# Patient Record
Sex: FEMALE | Race: WHITE | NOT HISPANIC OR LATINO | Employment: UNEMPLOYED | URBAN - METROPOLITAN AREA
[De-identification: names, ages, dates, MRNs, and addresses within clinical notes are randomized per-mention and may not be internally consistent; named-entity substitution may affect disease eponyms.]

---

## 2017-05-22 ENCOUNTER — GENERIC CONVERSION - ENCOUNTER (OUTPATIENT)
Dept: OTHER | Facility: OTHER | Age: 22
End: 2017-05-22

## 2017-07-21 ENCOUNTER — TRANSCRIBE ORDERS (OUTPATIENT)
Dept: ADMINISTRATIVE | Facility: HOSPITAL | Age: 22
End: 2017-07-21

## 2017-07-21 ENCOUNTER — APPOINTMENT (OUTPATIENT)
Dept: LAB | Facility: HOSPITAL | Age: 22
End: 2017-07-21
Attending: FAMILY MEDICINE

## 2017-07-21 DIAGNOSIS — Z02.1 PRE-EMPLOYMENT EXAMINATION: ICD-10-CM

## 2017-07-21 DIAGNOSIS — Z02.1 PRE-EMPLOYMENT EXAMINATION: Primary | ICD-10-CM

## 2017-07-21 LAB — RUBV IGG SERPL IA-ACNC: 27.7 IU/ML

## 2017-07-21 PROCEDURE — 36415 COLL VENOUS BLD VENIPUNCTURE: CPT

## 2017-07-21 PROCEDURE — 86787 VARICELLA-ZOSTER ANTIBODY: CPT

## 2017-07-21 PROCEDURE — 86706 HEP B SURFACE ANTIBODY: CPT

## 2017-07-21 PROCEDURE — 86480 TB TEST CELL IMMUN MEASURE: CPT

## 2017-07-21 PROCEDURE — 86765 RUBEOLA ANTIBODY: CPT

## 2017-07-21 PROCEDURE — 86735 MUMPS ANTIBODY: CPT

## 2017-07-21 PROCEDURE — 87340 HEPATITIS B SURFACE AG IA: CPT

## 2017-07-21 PROCEDURE — 86762 RUBELLA ANTIBODY: CPT

## 2017-07-22 LAB
HBV SURFACE AB SER-ACNC: 14.41 MIU/ML
HBV SURFACE AG SER QL: NORMAL

## 2017-07-24 LAB
ANNOTATION COMMENT IMP: NORMAL
GAMMA INTERFERON BACKGROUND BLD IA-ACNC: 0.03 IU/ML
M TB IFN-G BLD-IMP: NEGATIVE
M TB IFN-G CD4+ BCKGRND COR BLD-ACNC: <0.01 IU/ML
M TB IFN-G CD4+ T-CELLS BLD-ACNC: 0.02 IU/ML
MITOGEN IGNF BLD-ACNC: >10 IU/ML
QUANTIFERON-TB GOLD IN TUBE: NORMAL
SERVICE CMNT-IMP: NORMAL

## 2017-07-25 LAB
MEV IGM SER-ACNC: <0.8 AU (ref 0–0.79)
MUV IGG SER QL: ABNORMAL
VZV IGG SER IA-ACNC: NORMAL

## 2017-10-19 ENCOUNTER — ALLSCRIPTS OFFICE VISIT (OUTPATIENT)
Dept: OTHER | Facility: OTHER | Age: 22
End: 2017-10-19

## 2017-10-20 NOTE — PROGRESS NOTES
Assessment  1  Other acute sinusitis (461 8) (J01 80)    Plan  Other acute sinusitis    · Azithromycin 250 MG Oral Tablet; TAKE 2 TABLETS ON DAY 1 THEN TAKE 1  TABLET A DAY FOR 4 DAYS    Chief Complaint  headaches,ears hurt, chest discomfort      History of Present Illness  HPI: pt states she has had been getting HA and her ears have been bothering her pts tates she has been coughing a little but not muchand chills yesterday       Review of Systems    Constitutional: feeling poorly, but-- as noted in HPI    ENT: hoarseness  Cardiovascular: no complaints of slow or fast heart rate, no chest pain, no palpitations, no leg claudication or lower extremity edema  Respiratory: cough, but-- as noted in HPI  Gastrointestinal: no complaints of abdominal pain, no constipation, no nausea or diarrhea, no vomiting, no bloody stools  Genitourinary: no complaints of dysuria, no incontinence, no pelvic pain, no dysmenorrhea, no vaginal discharge or abnormal vaginal bleeding  Active Problems  1  Acute UTI (599 0) (N39 0)   2  Body mass index (BMI) of 22 0 to 22 9 in adult (V85 1) (Z68 22)   3  Chronic allergic conjunctivitis (372 14) (H10 45)   4  Eczema, unspecified eczema   5  Encounter for PPD test (V74 1) (Z11 1)   6  History of Encounter for routine child health examination w/o abnormal findings (V20 2)   (Z00 129)   7  Encounter for screening for malignant neoplasm of colon (V76 51) (Z12 11)   8  Need for hepatitis B vaccination (V05 3) (Z23)   9  Need for influenza vaccination (V04 81) (Z23)   10  Oral contraceptive use (V25 41) (Z30 41)    Past Medical History  1  History of Abdominal pain of multiple sites (789 09) (R10 9)   2  History of Acute pharyngitis (462) (J02 9)   3  Acute pharyngitis (462) (J02 9)   4  History of Acute upper respiratory infection (465 9) (J06 9)   5  History of Chalazion (373 2) (H00 19)   6   History of Encounter for routine child health examination w/o abnormal findings (V20 2) (Z00 129)   7  History of urinary tract infection (V13 02) (Z87 440)   8  History of Infective otitis externa, unspecified laterality (380 10) (H60 399)   9  Other acute sinusitis (461 8) (J01 80)   10  History of Otitis media, unspecified laterality   11  Sore throat (462) (J02 9)   12  History of Vulvovaginitis candida albicans (112 1) (B37 3)  Active Problems And Past Medical History Reviewed: The active problems and past medical history were reviewed and updated today  Family History  Mother    1  No pertinent family history  Father    2  Family history of hypertension (V17 49) (Z82 49)  Family History Reviewed: The family history was reviewed and updated today  Social History   · Never A Smoker   · Oral contraceptive use (V25 41) (Z30 41)  The social history was reviewed and updated today  Surgical History  Surgical History Reviewed: The surgical history was reviewed and updated today  Current Meds   1  Viorele 0 15-0 02/0 01 MG (21/5) Oral Tablet; One daily; Therapy: (07794 12 60 01) to Recorded    The medication list was reviewed and updated today  Allergies  1  No Known Drug Allergies  2  Wheat    Vitals   Recorded: [id number] 03:11PM   Temperature 98 2 F   Heart Rate 80   Respiration 16   Systolic 96   Diastolic 54   Height 5 ft 1 in   Weight 110 lb    BMI Calculated 20 78   BSA Calculated 1 47     Physical Exam    Constitutional   General appearance: No acute distress, well appearing and well nourished  Eyes   Conjunctiva and lids: No swelling, erythema or discharge  Pupils and irises: Equal, round and reactive to light  Ears, Nose, Mouth, and Throat   External inspection of ears and nose: Normal     Otoscopic examination: Abnormal   The right tympanic membrane was bulging  The left tympanic membrane was bulging  Nasal mucosa, septum, and turbinates: Abnormal   The bilateral nasal mucosa was red     Oropharynx: Abnormal   The posterior pharynx was erythematous  Pulmonary   Auscultation of lungs: Clear to auscultation  Cardiovascular   Auscultation of heart: Normal rate and rhythm, normal S1 and S2, without murmurs  Abdomen   Abdomen: Non-tender, no masses           Signatures   Electronically signed by : Amadou Amado DO; Oct 19 2017  3:43PM EST                       (Author)

## 2018-01-13 VITALS
HEART RATE: 80 BPM | SYSTOLIC BLOOD PRESSURE: 96 MMHG | HEIGHT: 61 IN | BODY MASS INDEX: 20.77 KG/M2 | DIASTOLIC BLOOD PRESSURE: 54 MMHG | TEMPERATURE: 98.2 F | WEIGHT: 110 LBS | RESPIRATION RATE: 16 BRPM

## 2018-01-16 NOTE — PROGRESS NOTES
Assessment    1  Other acute sinusitis (461 8) (J01 80)    Plan  Other acute sinusitis    · Amoxicillin-Pot Clavulanate 875-125 MG Oral Tablet (Augmentin); TAKE 1 TABLET  EVERY 12 HOURS DAILY    Chief Complaint  complaining of cough and cold symptoms with green/yellow phlegm  acgCMA      History of Present Illness  HPI: pt states she keeps getting sick, states she has bad head pain  throat hurts, head hurts congeted cant breath  drip  green and yellow mucus  Active Problems    1  Chronic allergic conjunctivitis (372 14) (H10 45)   2  Eczema, unspecified eczema (692 9) (L30 9)   3  Encounter for PPD test (V74 1) (Z11 1)   4  History of Encounter for routine child health examination w/o abnormal findings (V20 2)   (Z00 129)   5  Encounter for screening for malignant neoplasm of colon (V76 51) (Z12 11)   6  Need for hepatitis B vaccination (V05 3) (Z23)   7  Need for influenza vaccination (V04 81) (Z23)   8  Oral contraceptive use (V25 41) (Z30 41)    Past Medical History    1  History of Abdominal pain of multiple sites (789 09) (R10 9)   2  Acute pharyngitis (462) (J02 9)   3  History of Acute pharyngitis (462) (J02 9)   4  History of Chalazion (373 2) (H00 19)   5  History of Encounter for routine child health examination w/o abnormal findings (V20 2)   (Z00 129)   6  History of urinary tract infection (V13 02) (Z87 440)   7  History of Infective otitis externa, unspecified laterality (380 10) (H60 399)   8  History of Otitis media, unspecified laterality   9  Sore throat (462) (J02 9)   10  History of Vulvovaginitis candida albicans (112 1) (B37 3)  Active Problems And Past Medical History Reviewed: The active problems and past medical history were reviewed and updated today  Family History    1  No pertinent family history    2  Family history of hypertension (V17 49) (Z82 49)  Family History Reviewed: The family history was reviewed and updated today         Social History    · Never A Smoker   · Oral contraceptive use (V25 41) (Z30 41)  The social history was reviewed and updated today  Surgical History  Surgical History Reviewed: The surgical history was reviewed and updated today  Current Meds   1  Viorele 0 15-0 02/0 01 MG (21/5) Oral Tablet; One daily; Therapy: [phone]) to Recorded    The medication list was reviewed and updated today  Allergies    1  No Known Drug Allergies    2  Wheat    Vitals   Recorded: [id number] 11:46AM   Temperature 98 3 F   Heart Rate 70   Respiration 16   Systolic 359   Diastolic 60   Height 5 ft 1 in   Weight 115 lb    BMI Calculated 21 73   BSA Calculated 1 49     Physical Exam    Constitutional   General appearance: No acute distress, well appearing and well nourished  Eyes   Conjunctiva and lids: No swelling, erythema or discharge  Pupils and irises: Equal, round and reactive to light  Ears, Nose, Mouth, and Throat   External inspection of ears and nose: Normal     Otoscopic examination: Abnormal   The right tympanic membrane was bulging  The left tympanic membrane was bulging  Nasal mucosa, septum, and turbinates: Abnormal   The bilateral nasal mucosa was red  Oropharynx: Abnormal   The posterior pharynx was erythematous, but did not have an exudate  Pulmonary   Auscultation of lungs: Clear to auscultation  Cardiovascular   Auscultation of heart: Normal rate and rhythm, normal S1 and S2, without murmurs  Abdomen   Abdomen: Non-tender, no masses           Signatures   Electronically signed by : Irene Tompkins DO; Feb 1 2016 11:57AM EST                       (Author)

## 2018-04-16 ENCOUNTER — OFFICE VISIT (OUTPATIENT)
Dept: FAMILY MEDICINE CLINIC | Facility: CLINIC | Age: 23
End: 2018-04-16
Payer: COMMERCIAL

## 2018-04-16 VITALS
DIASTOLIC BLOOD PRESSURE: 54 MMHG | SYSTOLIC BLOOD PRESSURE: 100 MMHG | RESPIRATION RATE: 16 BRPM | HEART RATE: 72 BPM | TEMPERATURE: 97.5 F | WEIGHT: 122 LBS | HEIGHT: 61 IN | BODY MASS INDEX: 23.03 KG/M2

## 2018-04-16 DIAGNOSIS — J02.9 EXUDATIVE PHARYNGITIS: Primary | ICD-10-CM

## 2018-04-16 PROCEDURE — 3008F BODY MASS INDEX DOCD: CPT | Performed by: NURSE PRACTITIONER

## 2018-04-16 PROCEDURE — 99213 OFFICE O/P EST LOW 20 MIN: CPT | Performed by: NURSE PRACTITIONER

## 2018-04-16 RX ORDER — DESOGESTREL AND ETHINYL ESTRADIOL 21-5 (28)
1 KIT ORAL DAILY
COMMUNITY

## 2018-04-16 RX ORDER — AMOXICILLIN 875 MG/1
875 TABLET, COATED ORAL 2 TIMES DAILY
Qty: 20 TABLET | Refills: 0 | Status: SHIPPED | OUTPATIENT
Start: 2018-04-16 | End: 2018-04-26

## 2018-04-16 NOTE — PATIENT INSTRUCTIONS
Take medication with food  It is important that you take the entire course of antibiotics prescribed  May also take a probiotic of your choice to maintain healthy GI jazmine  Can take some probiotic and yogurt with the medication  Increase fluid intake, saline nasal rinses, and hot tea with honey and lemon  Cool air humidification can be helpful as well  May take Ibuprofen or Tylenol as needed for pain or fevers  Mucinex D for sinus congestion or Coricidin HBP if you have high blood pressure or a heart condition  Mucinex or Robitussin DM are effective for cough and chest congestion  Supportive care discussed and advised  Follow up for no improvement and worsening of conditions  Patient advised and educated when to see immediate medical care  Pharyngitis   AMBULATORY CARE:   Pharyngitis , or sore throat, is inflammation of the tissues and structures in your pharynx (throat)  Pharyngitis is most often caused by bacteria  It may also be caused by a cold or flu virus  Other causes include smoking, allergies, or acid reflux  Signs and symptoms that may occur with pharyngitis:   · Sore throat or pain when you swallow    · Fever, chills, and body aches    · Hoarse or raspy voice    · Cough, runny or stuffy nose, itchy or watery eyes    · Headache    · Upset stomach and loss of appetite    · Mild neck stiffness    · Swollen glands that feel like hard lumps when you touch your neck    · White and yellow pus-filled blisters in the back of your throat  Call 911 for any of the following:   · You have trouble breathing or swallowing because your throat is swollen or sore  Seek care immediately if:   · You are drooling because it hurts too much to swallow  · Your fever is higher than 102? F (39?C) or lasts longer than 3 days  · You are confused  · You taste blood in your throat  Contact your healthcare provider if:   · Your throat pain gets worse      · You have a painful lump in your throat that does not go away after 5 days  · Your symptoms do not improve after 5 days  · You have questions or concerns about your condition or care  Treatment for pharyngitis:  Viral pharyngitis will go away on its own without treatment  Your sore throat should start to feel better in 3 to 5 days for both viral and bacterial infections  You may need any of the following:  · Antibiotics  treat a bacterial infection  · NSAIDs , such as ibuprofen, help decrease swelling, pain, and fever  NSAIDs can cause stomach bleeding or kidney problems in certain people  If you take blood thinner medicine, always ask your healthcare provider if NSAIDs are safe for you  Always read the medicine label and follow directions  · Acetaminophen  decreases pain and fever  It is available without a doctor's order  Ask how much to take and how often to take it  Follow directions  Acetaminophen can cause liver damage if not taken correctly  Manage your symptoms:   · Gargle salt water  Mix ¼ teaspoon salt in an 8 ounce glass of warm water and gargle  This may help decrease swelling in your throat  · Drink liquids as directed  You may need to drink more liquids than usual  Liquids may help soothe your throat and prevent dehydration  Ask how much liquid to drink each day and which liquids are best for you  · Use a cool-steam humidifier  to help moisten the air in your room and calm your cough  · Soothe your throat  with cough drops, ice, soft foods, or popsicles  Prevent the spread of pharyngitis:  Cover your mouth and nose when you cough or sneeze  Do not share food or drinks  Wash your hands often  Use soap and water  If soap and water are unavailable, use an alcohol based hand   Follow up with your healthcare provider as directed:  Write down your questions so you remember to ask them during your visits     © 2017 [employer] Information is for End User's use only and may not be sold, redistributed or otherwise used for commercial purposes  All illustrations and images included in CareNotes® are the copyrighted property of [employer]  or [address]  The above information is an  only  It is not intended as medical advice for individual conditions or treatments  Talk to your doctor, nurse or pharmacist before following any medical regimen to see if it is safe and effective for you  Amoxicillin (By mouth)   Amoxicillin ([id number])  Treats infections or stomach ulcers  This medicine is a penicillin antibiotic  Brand Name(s): Amoxil, Moxatag, Omeclamox-Benito, Prevpac, Triple Therapy   There may be other brand names for this medicine  When This Medicine Should Not Be Used: This medicine is not right for everyone  You should not use it if you had an allergic reaction to amoxicillin, any type of penicillin, or a cephalosporin antibiotic  How to Use This Medicine:   Capsule, Liquid, Tablet, Chewable Tablet, Long Acting Tablet  · Your doctor will tell you how much medicine to use  Do not use more than directed  · Chewable tablet: You must chew the tablet before you swallow it  You may crush the tablet and mix the medicine with a small amount of food to make it easier to swallow  · Oral liquid: Shake well just before each use  · Measure the oral liquid medicine with a marked measuring spoon, oral syringe, or medicine cup  You may mix the oral liquid with a baby formula, milk, fruit juice, water, ginger ale, or another cold drink  Be sure your child drinks all of the mixture right away  · Tablet for suspension: Place the tablet in a small drinking glass, and add 2 teaspoons of water  Do not use any other liquid  Gently stir or swirl the water in the glass until the tablet is completely dissolved  Drink all of this mixture right away  Add more water to the glass and drink all of it to make sure you get all of the medicine   Do not chew or swallow the tablet for suspension  · Take all of the medicine in your prescription to clear up your infection, even if you feel better after the first few doses  · Take a dose as soon as you remember  If it is almost time for your next dose, wait until then and take a regular dose  Do not take extra medicine to make up for a missed dose  · Store the tablets, capsules, and tablets for suspension at room temperature, away from heat, moisture, and direct light  · Store the oral liquid in the refrigerator  Do not freeze  Throw away any unused medicine after 14 days  Drugs and Foods to Avoid:   Ask your doctor or pharmacist before using any other medicine, including over-the-counter medicines, vitamins, and herbal products  · Some medicines can affect how amoxicillin works  Tell your doctor if you are also using any of the following:   ¨ Allopurinol  ¨ Probenecid  ¨ Birth control pills  ¨ A blood thinner  Warnings While Using This Medicine:   · Tell your doctor if you are pregnant or breastfeeding, or if you have kidney disease, allergies, or a condition called phenylketonuria (PKU)  Tell your doctor if you are on dialysis  · This medicine can cause diarrhea  Call your doctor if the diarrhea becomes severe, does not stop, or is bloody  Do not take any medicine to stop diarrhea until you have talked to your doctor  Diarrhea can occur 2 months or more after you stop taking this medicine  · Tell any doctor or dentist who treats you that you are using this medicine  This medicine may affect certain medical test results  · Call your doctor if your symptoms do not improve or if they get worse  · Use this medicine to treat only the infection your doctor has prescribed it for  Do not use this medicine for any infection or condition that has not been checked by a doctor  This medicine will not treat the flu or the common cold  · Keep all medicine out of the reach of children  Never share your medicine with anyone    Possible Side Effects While Using This Medicine:   Call your doctor right away if you notice any of these side effects:  · Allergic reaction: Itching or hives, swelling in your face or hands, swelling or tingling in your mouth or throat, chest tightness, trouble breathing  · Blistering, peeling, or red skin rash  · Diarrhea that may contain blood, stomach cramps, fever  If you notice these less serious side effects, talk with your doctor:   · Mild diarrhea, nausea, or vomiting  · Mild skin rash  If you notice other side effects that you think are caused by this medicine, tell your doctor  Call your doctor for medical advice about side effects  You may report side effects to FDA at 4-635-FDA-9318  © 2017 [employer] Information is for End User's use only and may not be sold, redistributed or otherwise used for commercial purposes  The above information is an  only  It is not intended as medical advice for individual conditions or treatments  Talk to your doctor, nurse or pharmacist before following any medical regimen to see if it is safe and effective for you

## 2018-04-16 NOTE — PROGRESS NOTES
Assessment/Plan:  Take medication with food  It is important that you take the entire course of antibiotics prescribed  May also take a probiotic of your choice to maintain healthy GI jazmine  Can take some probiotic and yogurt with the medication  Increase fluid intake, saline nasal rinses, and hot tea with honey and lemon  Cool air humidification can be helpful as well  May take Ibuprofen or Tylenol as needed for pain or fevers  Mucinex D for sinus congestion or Coricidin HBP if you have high blood pressure or a heart condition  Mucinex or Robitussin DM are effective for cough and chest congestion  Supportive care discussed and advised  Follow up for no improvement and worsening of conditions  Patient advised and educated when to see immediate medical care  Diagnoses and all orders for this visit:    Exudative pharyngitis  -     amoxicillin (AMOXIL) 875 mg tablet; Take 1 tablet (875 mg total) by mouth 2 (two) times a day for 10 days    BMI 23 0-23 9, adult    Other orders  -     desogestrel-ethinyl estradiol (VIORELE) 0 15-0 02/0 01 MG (21/5) per tablet; Take 1 tablet by mouth daily          Return if symptoms worsen or fail to improve  Subjective:      Patient ID: Alex Rascon is a 23 y o  female  Chief Complaint   Patient presents with    Cough     Started one week ago  No known fevers JMoyleLPN    Headache    Earache       HPI   Patient having nasal congestion, sore throat, headache and ear ache from 5 days and using Mucinex every 4 hours but not helping and getting worse  Denies chest pain, SOB, fever and chills  The following portions of the patient's history were reviewed and updated as appropriate: allergies, current medications, past family history, past medical history, past social history, past surgical history and problem list       Review of Systems   Constitutional: Negative for chills, fatigue and fever     HENT: Positive for congestion, ear pain and sore throat  Negative for ear discharge, facial swelling, hearing loss, mouth sores, nosebleeds, postnasal drip, rhinorrhea, sinus pain, sinus pressure, sneezing, trouble swallowing and voice change  Respiratory: Negative for cough, chest tightness, shortness of breath and wheezing  Cardiovascular: Negative  Gastrointestinal: Negative for abdominal pain, constipation, diarrhea and nausea  Neurological: Positive for headaches  Negative for dizziness  Objective:    History   Smoking Status    Not on file   Smokeless Tobacco    Not on file       Allergies: Allergies   Allergen Reactions    Wheat Bran Abdominal Pain       Vitals:  /54   Pulse 72   Temp 97 5 °F (36 4 °C)   Resp 16   Ht 5' 1" (1 549 m)   Wt 55 3 kg (122 lb)   LMP 02/20/2018 Comment: approx   BMI 23 05 kg/m²     Current Outpatient Prescriptions   Medication Sig Dispense Refill    desogestrel-ethinyl estradiol (VIORELE) 0 15-0 02/0 01 MG (21/5) per tablet Take 1 tablet by mouth daily      amoxicillin (AMOXIL) 875 mg tablet Take 1 tablet (875 mg total) by mouth 2 (two) times a day for 10 days 20 tablet 0     No current facility-administered medications for this visit  Physical Exam   Constitutional: She is oriented to person, place, and time  She appears well-developed and well-nourished  HENT:   Head: Normocephalic  Right Ear: Tympanic membrane, external ear and ear canal normal    Left Ear: Tympanic membrane, external ear and ear canal normal    Nose: Nose normal  Right sinus exhibits no maxillary sinus tenderness and no frontal sinus tenderness  Left sinus exhibits no maxillary sinus tenderness and no frontal sinus tenderness  Mouth/Throat: Mucous membranes are normal  Oropharyngeal exudate and posterior oropharyngeal erythema present  Neck: Neck supple  Cardiovascular: Normal rate, regular rhythm and normal heart sounds      Pulmonary/Chest: Effort normal and breath sounds normal    Musculoskeletal: Normal range of motion  Lymphadenopathy:        Right cervical: No superficial cervical and no posterior cervical adenopathy present  Left cervical: No superficial cervical and no posterior cervical adenopathy present  Neurological: She is alert and oriented to person, place, and time  Skin: Skin is warm and dry  Psychiatric: She has a normal mood and affect  Her behavior is normal  Judgment and thought content normal    Vitals reviewed          LUIS Bynum

## 2018-05-01 ENCOUNTER — OFFICE VISIT (OUTPATIENT)
Dept: FAMILY MEDICINE CLINIC | Facility: CLINIC | Age: 23
End: 2018-05-01
Payer: COMMERCIAL

## 2018-05-01 VITALS
HEART RATE: 74 BPM | WEIGHT: 121.6 LBS | BODY MASS INDEX: 22.98 KG/M2 | SYSTOLIC BLOOD PRESSURE: 96 MMHG | DIASTOLIC BLOOD PRESSURE: 54 MMHG | TEMPERATURE: 98.6 F | RESPIRATION RATE: 16 BRPM

## 2018-05-01 DIAGNOSIS — H69.82 DYSFUNCTION OF LEFT EUSTACHIAN TUBE: Primary | ICD-10-CM

## 2018-05-01 PROCEDURE — 1036F TOBACCO NON-USER: CPT | Performed by: NURSE PRACTITIONER

## 2018-05-01 PROCEDURE — 99213 OFFICE O/P EST LOW 20 MIN: CPT | Performed by: NURSE PRACTITIONER

## 2018-05-01 RX ORDER — METHYLPREDNISOLONE 4 MG/1
TABLET ORAL
Qty: 1 EACH | Refills: 0 | Status: SHIPPED | OUTPATIENT
Start: 2018-05-01 | End: 2018-05-07

## 2018-05-01 NOTE — PROGRESS NOTES
Assessment/Plan:    Problem List Items Addressed This Visit     None      Visit Diagnoses     Dysfunction of left eustachian tube    -  Primary    Relevant Medications    Methylprednisolone 4 MG TBPK          Patient Instructions   Salt water gargles, hot tea with honey and lemon  Tylenol or Advil as needed       No Follow-up on file  Subjective:      Patient ID: Cynthia Bellamy is a 23 y o  female  Chief Complaint   Patient presents with    throat pain     sharp pain on the rigth side in her throat after completing  abx which was on friday 4/27/18  af/rma        She completed a course of Amoxicillin on 4/24 for exudative pharyngitis  Once she stopped, she developed sharp pains in the left side of her throat that can be severe  This is worse with sneezing, coughing, or swallowing  Had residual post nasal drip and nasal sinus congestion  Denies fevers  Not taking anything OTC for this         The following portions of the patient's history were reviewed and updated as appropriate: allergies, current medications, past family history, past medical history, past social history, past surgical history and problem list     Review of Systems   Constitutional: Negative for chills, fatigue and fever  HENT: Positive for congestion, postnasal drip and sore throat  Negative for ear pain, rhinorrhea and sinus pressure  Respiratory: Negative for cough, shortness of breath and wheezing  Cardiovascular: Negative for chest pain  Gastrointestinal: Negative for abdominal pain, diarrhea, nausea and vomiting  Musculoskeletal: Negative for arthralgias  Skin: Negative for rash  Neurological: Negative for headaches           Current Outpatient Prescriptions   Medication Sig Dispense Refill    desogestrel-ethinyl estradiol (VIORELE) 0 15-0 02/0 01 MG (21/5) per tablet Take 1 tablet by mouth daily      Methylprednisolone 4 MG TBPK Use as directed on package 1 each 0     No current facility-administered medications for this visit  Objective:    BP 96/54   Pulse 74   Temp 98 6 °F (37 °C)   Resp 16   Wt 55 2 kg (121 lb 9 6 oz)   LMP 02/26/2018   BMI 22 98 kg/m²        Physical Exam   Constitutional: She appears well-developed and well-nourished  HENT:   Right Ear: External ear and ear canal normal  Tympanic membrane is bulging  Left Ear: External ear and ear canal normal  Tympanic membrane is bulging  Nose: No mucosal edema  Mouth/Throat: Oropharynx is clear and moist and mucous membranes are normal    Eyes: Conjunctivae are normal    Cardiovascular: Normal rate, regular rhythm and normal heart sounds  Pulmonary/Chest: Effort normal and breath sounds normal    Abdominal: Bowel sounds are normal  She exhibits no distension  There is no splenomegaly or hepatomegaly  There is no tenderness  Lymphadenopathy:        Right cervical: No superficial cervical adenopathy present  Left cervical: No superficial cervical adenopathy present  Skin: No rash noted  Psychiatric: She has a normal mood and affect  Nursing note and vitals reviewed               Juana Hansen

## 2019-03-20 ENCOUNTER — HOSPITAL ENCOUNTER (EMERGENCY)
Facility: HOSPITAL | Age: 24
Discharge: HOME | End: 2019-03-21
Attending: EMERGENCY MEDICINE | Admitting: EMERGENCY MEDICINE
Payer: COMMERCIAL

## 2019-03-20 VITALS
BODY MASS INDEX: 22.66 KG/M2 | DIASTOLIC BLOOD PRESSURE: 92 MMHG | OXYGEN SATURATION: 99 % | TEMPERATURE: 98.2 F | HEIGHT: 61 IN | WEIGHT: 120 LBS | HEART RATE: 109 BPM | SYSTOLIC BLOOD PRESSURE: 151 MMHG | RESPIRATION RATE: 20 BRPM

## 2019-03-20 DIAGNOSIS — K52.9 GASTROENTERITIS: ICD-10-CM

## 2019-03-20 DIAGNOSIS — R11.2 NAUSEA VOMITING AND DIARRHEA: Primary | ICD-10-CM

## 2019-03-20 DIAGNOSIS — R19.7 NAUSEA VOMITING AND DIARRHEA: Primary | ICD-10-CM

## 2019-03-20 LAB
ALBUMIN SERPL BCP-MCNC: 3.8 G/DL (ref 3.5–5)
ALP SERPL-CCNC: 51 U/L (ref 46–116)
ALT SERPL W P-5'-P-CCNC: 22 U/L (ref 12–78)
ANION GAP SERPL CALCULATED.3IONS-SCNC: 14 MMOL/L (ref 4–13)
AST SERPL W P-5'-P-CCNC: 16 U/L (ref 5–45)
BACTERIA UR QL AUTO: ABNORMAL /HPF
BASOPHILS # BLD AUTO: 0.02 THOUSANDS/ΜL (ref 0–0.1)
BASOPHILS NFR BLD AUTO: 0 % (ref 0–1)
BILIRUB SERPL-MCNC: 0.4 MG/DL (ref 0.2–1)
BILIRUB UR QL STRIP: ABNORMAL
BUN SERPL-MCNC: 10 MG/DL (ref 5–25)
CALCIUM SERPL-MCNC: 9.3 MG/DL (ref 8.3–10.1)
CHLORIDE SERPL-SCNC: 102 MMOL/L (ref 100–108)
CLARITY UR: CLEAR
CO2 SERPL-SCNC: 23 MMOL/L (ref 21–32)
COLOR UR: YELLOW
CREAT SERPL-MCNC: 0.86 MG/DL (ref 0.6–1.3)
EOSINOPHIL # BLD AUTO: 0.03 THOUSAND/ΜL (ref 0–0.61)
EOSINOPHIL NFR BLD AUTO: 0 % (ref 0–6)
ERYTHROCYTE [DISTWIDTH] IN BLOOD BY AUTOMATED COUNT: 13.5 % (ref 11.6–15.1)
EXT PREG TEST URINE: NEGATIVE
GFR SERPL CREATININE-BSD FRML MDRD: 94 ML/MIN/1.73SQ M
GLUCOSE SERPL-MCNC: 130 MG/DL (ref 65–140)
GLUCOSE UR STRIP-MCNC: NEGATIVE MG/DL
HCT VFR BLD AUTO: 45.3 % (ref 34.8–46.1)
HGB BLD-MCNC: 14.6 G/DL (ref 11.5–15.4)
HGB UR QL STRIP.AUTO: NEGATIVE
IMM GRANULOCYTES # BLD AUTO: 0.21 THOUSAND/UL (ref 0–0.2)
IMM GRANULOCYTES NFR BLD AUTO: 1 % (ref 0–2)
KETONES UR STRIP-MCNC: ABNORMAL MG/DL
LEUKOCYTE ESTERASE UR QL STRIP: ABNORMAL
LIPASE SERPL-CCNC: 92 U/L (ref 73–393)
LYMPHOCYTES # BLD AUTO: 1.89 THOUSANDS/ΜL (ref 0.6–4.47)
LYMPHOCYTES NFR BLD AUTO: 13 % (ref 14–44)
MCH RBC QN AUTO: 27.1 PG (ref 26.8–34.3)
MCHC RBC AUTO-ENTMCNC: 32.2 G/DL (ref 31.4–37.4)
MCV RBC AUTO: 84 FL (ref 82–98)
MONOCYTES # BLD AUTO: 0.81 THOUSAND/ΜL (ref 0.17–1.22)
MONOCYTES NFR BLD AUTO: 5 % (ref 4–12)
MUCOUS THREADS UR QL AUTO: ABNORMAL
NEUTROPHILS # BLD AUTO: 12.1 THOUSANDS/ΜL (ref 1.85–7.62)
NEUTS SEG NFR BLD AUTO: 81 % (ref 43–75)
NITRITE UR QL STRIP: NEGATIVE
NON-SQ EPI CELLS URNS QL MICRO: ABNORMAL /HPF
NRBC BLD AUTO-RTO: 0 /100 WBCS
PH UR STRIP.AUTO: 5.5 [PH]
PLATELET # BLD AUTO: 310 THOUSANDS/UL (ref 149–390)
PMV BLD AUTO: 10.1 FL (ref 8.9–12.7)
POTASSIUM SERPL-SCNC: 3.5 MMOL/L (ref 3.5–5.3)
PROT SERPL-MCNC: 7.9 G/DL (ref 6.4–8.2)
PROT UR STRIP-MCNC: ABNORMAL MG/DL
RBC # BLD AUTO: 5.39 MILLION/UL (ref 3.81–5.12)
RBC #/AREA URNS AUTO: ABNORMAL /HPF
SODIUM SERPL-SCNC: 139 MMOL/L (ref 136–145)
SP GR UR STRIP.AUTO: >=1.03 (ref 1–1.03)
UROBILINOGEN UR QL STRIP.AUTO: 0.2 E.U./DL
WBC # BLD AUTO: 15.06 THOUSAND/UL (ref 4.31–10.16)
WBC #/AREA URNS AUTO: ABNORMAL /HPF

## 2019-03-20 PROCEDURE — 96374 THER/PROPH/DIAG INJ IV PUSH: CPT

## 2019-03-20 PROCEDURE — 80053 COMPREHEN METABOLIC PANEL: CPT

## 2019-03-20 PROCEDURE — 81001 URINALYSIS AUTO W/SCOPE: CPT

## 2019-03-20 PROCEDURE — 96375 TX/PRO/DX INJ NEW DRUG ADDON: CPT

## 2019-03-20 PROCEDURE — 96361 HYDRATE IV INFUSION ADD-ON: CPT

## 2019-03-20 PROCEDURE — 85025 COMPLETE CBC W/AUTO DIFF WBC: CPT

## 2019-03-20 PROCEDURE — 83690 ASSAY OF LIPASE: CPT

## 2019-03-20 PROCEDURE — 81025 URINE PREGNANCY TEST: CPT

## 2019-03-20 PROCEDURE — 99283 EMERGENCY DEPT VISIT LOW MDM: CPT

## 2019-03-20 PROCEDURE — 36415 COLL VENOUS BLD VENIPUNCTURE: CPT

## 2019-03-20 RX ORDER — ONDANSETRON 2 MG/ML
4 INJECTION INTRAMUSCULAR; INTRAVENOUS ONCE
Status: COMPLETED | OUTPATIENT
Start: 2019-03-20 | End: 2019-03-20

## 2019-03-20 RX ADMIN — SODIUM CHLORIDE 1000 ML: 0.9 INJECTION, SOLUTION INTRAVENOUS at 23:50

## 2019-03-20 RX ADMIN — MORPHINE SULFATE 2 MG: 2 INJECTION, SOLUTION INTRAMUSCULAR; INTRAVENOUS at 22:40

## 2019-03-20 RX ADMIN — ONDANSETRON 4 MG: 2 INJECTION INTRAMUSCULAR; INTRAVENOUS at 22:39

## 2019-03-20 RX ADMIN — SODIUM CHLORIDE 1000 ML: 0.9 INJECTION, SOLUTION INTRAVENOUS at 22:29

## 2019-03-21 ENCOUNTER — VBI (OUTPATIENT)
Dept: FAMILY MEDICINE CLINIC | Facility: CLINIC | Age: 24
End: 2019-03-21

## 2019-03-21 RX ORDER — ONDANSETRON 4 MG/1
4 TABLET, FILM COATED ORAL EVERY 6 HOURS PRN
Qty: 8 TABLET | Refills: 0 | Status: SHIPPED | OUTPATIENT
Start: 2019-03-21

## 2019-03-21 NOTE — ED PROVIDER NOTES
History  Chief Complaint   Patient presents with    Vomiting     patient started vomiting and diarrhea about 2030, does have celiac disease and was supposed to have gluten free pasta from a restaurant, has had many vomiting epuisodes in past 1 5 hours     21 yo female ate pasta ordered from take out about 4 hours ago and then 2 hours ago started with severe abdominal pains and recurrent vomiting   + associated diarrhea  No fever  Had been visiting brother in hospital as well all day today  Had celiac and is very sensitive per   History provided by:  Patient and significant other   used: No    Vomiting   Associated symptoms: abdominal pain and diarrhea    Associated symptoms: no cough, no fever, no headaches and no myalgias        Prior to Admission Medications   Prescriptions Last Dose Informant Patient Reported? Taking?   desogestrel-ethinyl estradiol (VIORELE) 0 15-0 02/0 01 MG (21/5) per tablet   Yes No   Sig: Take 1 tablet by mouth daily      Facility-Administered Medications: None       Past Medical History:   Diagnosis Date    Celiac disease     IBS (irritable bowel syndrome)        Past Surgical History:   Procedure Laterality Date    ESOPHAGOGASTRODUODENOSCOPY         History reviewed  No pertinent family history  I have reviewed and agree with the history as documented  Social History     Tobacco Use    Smoking status: Never Smoker    Smokeless tobacco: Never Used   Substance Use Topics    Alcohol use: Yes     Comment: occ    Drug use: Never        Review of Systems   Constitutional: Negative  Negative for fever  HENT: Negative  Eyes: Negative  Respiratory: Negative  Negative for cough and shortness of breath  Cardiovascular: Negative  Negative for chest pain  Gastrointestinal: Positive for abdominal pain, diarrhea, nausea and vomiting  Genitourinary: Negative  Negative for dysuria and flank pain  Musculoskeletal: Negative    Negative for back pain and myalgias  Skin: Negative  Negative for rash  Neurological: Positive for weakness  Negative for dizziness and headaches  Hematological: Does not bruise/bleed easily  Psychiatric/Behavioral: Negative  All other systems reviewed and are negative  Physical Exam  Physical Exam   Constitutional: She appears well-developed and well-nourished  No distress  HENT:   Head: Normocephalic and atraumatic  Mm dry   Eyes: Pupils are equal, round, and reactive to light  Conjunctivae are normal    Neck: Normal range of motion  Neck supple  Cardiovascular: Regular rhythm and normal heart sounds  Tachycardia present  No murmur heard  Pulmonary/Chest: Effort normal and breath sounds normal  No respiratory distress  Abdominal: Soft  Bowel sounds are normal  She exhibits no distension  There is tenderness  Diffuse ttp    Musculoskeletal: Normal range of motion  She exhibits no edema or deformity  Neurological: She is alert  No cranial nerve deficit  Skin: Skin is warm and dry  No rash noted  She is not diaphoretic  There is pallor  Psychiatric: She has a normal mood and affect  Her behavior is normal    Nursing note and vitals reviewed        Vital Signs  ED Triage Vitals [03/20/19 2213]   Temperature Pulse Respirations Blood Pressure SpO2   98 2 °F (36 8 °C) (!) 109 20 151/92 99 %      Temp Source Heart Rate Source Patient Position - Orthostatic VS BP Location FiO2 (%)   Tympanic Monitor Sitting Right arm --      Pain Score       8           Vitals:    03/20/19 2213   BP: 151/92   Pulse: (!) 109   Patient Position - Orthostatic VS: Sitting         Visual Acuity      ED Medications  Medications   sodium chloride 0 9 % bolus 1,000 mL (1,000 mL Intravenous New Bag 3/20/19 2350)   sodium chloride 0 9 % bolus 1,000 mL (0 mL Intravenous Stopped 3/20/19 2350)   ondansetron (ZOFRAN) injection 4 mg (4 mg Intravenous Given 3/20/19 2239)   morphine injection 2 mg (2 mg Intravenous Given 3/20/19 2240) Diagnostic Studies  Results Reviewed     Procedure Component Value Units Date/Time    Comprehensive metabolic panel [[id number]]  (Abnormal) Collected:  03/20/19 2229    Lab Status:  Final result Specimen:  Blood from Arm, Left Updated:  03/20/19 2254     Sodium 139 mmol/L      Potassium 3 5 mmol/L      Chloride 102 mmol/L      CO2 23 mmol/L      ANION GAP 14 mmol/L      BUN 10 mg/dL      Creatinine 0 86 mg/dL      Glucose 130 mg/dL      Calcium 9 3 mg/dL      AST 16 U/L      ALT 22 U/L      Alkaline Phosphatase 51 U/L      Total Protein 7 9 g/dL      Albumin 3 8 g/dL      Total Bilirubin 0 40 mg/dL      eGFR 94 ml/min/1 73sq m     Narrative:       National Kidney Disease Education Program recommendations are as follows:  GFR calculation is accurate only with a steady state creatinine  Chronic Kidney disease less than 60 ml/min/1 73 sq  meters  Kidney failure less than 15 ml/min/1 73 sq  meters      Lipase [[id number]]  (Normal) Collected:  03/20/19 2229    Lab Status:  Final result Specimen:  Blood from Arm, Left Updated:  03/20/19 2254     Lipase 92 u/L     Urine Microscopic [[id number]]  (Abnormal) Collected:  03/20/19 2229    Lab Status:  Final result Specimen:  Urine, Other Updated:  03/20/19 2247     RBC, UA 0-1 /hpf      WBC, UA 1-2 /hpf      Epithelial Cells Innumerable /hpf      Bacteria, UA Moderate /hpf      MUCUS THREADS Innumerable    CBC and differential [[id number]]  (Abnormal) Collected:  03/20/19 2229    Lab Status:  Final result Specimen:  Blood from Arm, Left Updated:  03/20/19 2242     WBC 15 06 Thousand/uL      RBC 5 39 Million/uL      Hemoglobin 14 6 g/dL      Hematocrit 45 3 %      MCV 84 fL      MCH 27 1 pg      MCHC 32 2 g/dL      RDW 13 5 %      MPV 10 1 fL      Platelets 149 Thousands/uL      nRBC 0 /100 WBCs      Neutrophils Relative 81 %      Immat GRANS % 1 %      Lymphocytes Relative 13 %      Monocytes Relative 5 %      Eosinophils Relative 0 %      Basophils Relative 0 % Neutrophils Absolute 12 10 Thousands/µL      Immature Grans Absolute 0 21 Thousand/uL      Lymphocytes Absolute 1 89 Thousands/µL      Monocytes Absolute 0 81 Thousand/µL      Eosinophils Absolute 0 03 Thousand/µL      Basophils Absolute 0 02 Thousands/µL     UA w Reflex to Microscopic [[id number]]  (Abnormal) Collected:  03/20/19 2229    Lab Status:  Final result Specimen:  Urine, Other Updated:  03/20/19 2239     Color, UA Yellow     Clarity, UA Clear     Specific Gravity, UA >=1 030     pH, UA 5 5     Leukocytes, UA Trace     Nitrite, UA Negative     Protein, UA 30 (1+) mg/dl      Glucose, UA Negative mg/dl      Ketones, UA 40 (2+) mg/dl      Urobilinogen, UA 0 2 E U /dl      Bilirubin, UA Interference- unable to analyze     Blood, UA Negative    POCT pregnancy, urine [[id number]]  (Normal) Resulted:  03/20/19 2239    Lab Status:  Final result Specimen:  Urine Updated:  03/20/19 2239     EXT PREG TEST UR (Ref: Negative) negative                 No orders to display              Procedures  Procedures       Phone Contacts  ED Phone Contact    ED Course                               MDM  Number of Diagnoses or Management Options  Diagnosis management comments: Pt  Feeling better, will discharge after second liter  Advised rest, sips of clears, Zofran prn, follow up outpt if worsening  Disposition  Final diagnoses:   Nausea vomiting and diarrhea   Gastroenteritis     Time reflects when diagnosis was documented in both MDM as applicable and the Disposition within this note     Time User Action Codes Description Comment    2/30/7254 17:84 AM Myranda Puri Add [R11 8,  R19 7] Nausea vomiting and diarrhea     3/82/0352 53:94 AM Myranda Puri Add [V34 4] Gastroenteritis       ED Disposition     ED Disposition Condition Date/Time Comment    Discharge Stable u Mar 21, 2019 12:27 AM Deloris Keene discharge to home/self care              Follow-up Information     Follow up With Specialties Details Why Contact Info    Leela Alvarez, DO Family Medicine  As needed [address] [place], [place] [id number] [phone]            Patient's Medications   Discharge Prescriptions    ONDANSETRON (ZOFRAN) 4 MG TABLET    Take 1 tablet (4 mg total) by mouth every 6 (six) hours as needed for nausea or vomiting       Start Date: 3/21/2019 End Date: --       Order Dose: 4 mg       Quantity: 8 tablet    Refills: 0     No discharge procedures on file      ED Provider  Electronically Signed by           Shoaib Moss MD  08/66/68 2840

## 2019-03-21 NOTE — TELEPHONE ENCOUNTER
Emily Carlos    ED Visit Information     Ed visit date: 03/20/2019  Diagnosis Description: Nausea vomiting and diarrhea; Gastroenteritis  In Network? Yes E[facility] Dessert  Discharge status: Home  Discharged with meds ? Yes  Number of ED visits to date: 0  ED Severity:NA     Outreach Information    Outreach successful: Yes 2  Date letter mailed:NA  Date Finalized:03/21/2019    Care Coordination    Follow up appointment with pcp: no no  Transportation issues ? NA    Value Base Outreach    [facility] for patient to return my call     Patient returned my call  She is ok taking anti nausea medication and will be going to get OTC diarrhea pills as that is the only symptom still ongoing  She was made aware to call [facility] is she does need anything

## 2020-07-08 ENCOUNTER — TELEPHONE (OUTPATIENT)
Dept: FAMILY MEDICINE CLINIC | Facility: CLINIC | Age: 25
End: 2020-07-08

## 2020-07-08 NOTE — TELEPHONE ENCOUNTER
----- Message from Sierra Gonzalez sent at 6/22/2020 10:51 AM EDT -----  Regarding: overdue visit  Last visit May 2018, please contact patient for overdue visit

## 2020-07-20 ENCOUNTER — TELEPHONE (OUTPATIENT)
Dept: FAMILY MEDICINE CLINIC | Facility: CLINIC | Age: 25
End: 2020-07-20

## 2020-07-20 NOTE — TELEPHONE ENCOUNTER
LMOM   Is pt  Still with [facility] ? If so needs CPE  Please schedule when pt calls back  Please ask pt which provider she would like as PCP as she has been seeing multiple providers       Tanvir Hernandez MA

## 2020-07-20 NOTE — TELEPHONE ENCOUNTER
----- Message from Meron Marquez sent at 6/22/2020 10:51 AM EDT -----  Regarding: overdue visit  Last visit May 2018, please contact patient for overdue visit

## 2020-07-20 NOTE — TELEPHONE ENCOUNTER
Pt called back stating she will give the office a call when she is ready to come in   She is in the process of changing to her  insurance

## 2021-04-02 ENCOUNTER — TELEPHONE (OUTPATIENT)
Dept: FAMILY MEDICINE CLINIC | Facility: CLINIC | Age: 26
End: 2021-04-02

## 2021-04-02 NOTE — TELEPHONE ENCOUNTER
Called patient to verify pcp  Patient said she plans on continuing care at our practice but does not want to schedule at this time  Patient advised on the importance of keeping up with routine health maintenance/physicals     Zana Rueda MA

## 2022-01-02 ENCOUNTER — TREATMENT (OUTPATIENT)
Dept: URGENT CARE | Facility: CLINIC | Age: 27
End: 2022-01-02

## 2022-01-02 VITALS
WEIGHT: 136 LBS | RESPIRATION RATE: 18 BRPM | SYSTOLIC BLOOD PRESSURE: 118 MMHG | HEART RATE: 97 BPM | OXYGEN SATURATION: 100 % | DIASTOLIC BLOOD PRESSURE: 72 MMHG | BODY MASS INDEX: 25.7 KG/M2 | TEMPERATURE: 96 F

## 2022-01-02 DIAGNOSIS — S05.02XA ABRASION OF LEFT CORNEA, INITIAL ENCOUNTER: Primary | ICD-10-CM

## 2022-01-02 RX ORDER — OFLOXACIN 3 MG/ML
1 SOLUTION/ DROPS OPHTHALMIC 4 TIMES DAILY
Qty: 5 ML | Refills: 0 | Status: SHIPPED | OUTPATIENT
Start: 2022-01-02

## 2022-01-02 NOTE — PATIENT INSTRUCTIONS
[facility]  Address: [address] [place] [address] [place], [place], [address]   Phone: [phone]    Continue to monitor symptoms  If new or worsening symptoms develop, go immediately to Er  Drink plenty of fluids  Follow up with Eye Doctor this week  Corneal Abrasion   WHAT YOU NEED TO KNOW:   A corneal abrasion is a scratch on the cornea of your eye  The cornea is the clear layer that covers the front of your eye  A small scratch may heal in 1 to 2 days  Deeper or larger scratches may take longer to heal         DISCHARGE INSTRUCTIONS:   Call your doctor or ophthalmologist if:   · Your eye pain or vision gets worse  · You have yellow or green drainage from your eye  · You have questions or concerns about your condition or care  Medicines:   · Medicines  may be given in the form of eyedrops or ointment to help prevent an eye infection  You may also be given eyedrops to decrease pain  · Take your medicine as directed  Contact your healthcare provider if you think your medicine is not helping or if you have side effects  Tell him or her if you are allergic to any medicine  Keep a list of the medicines, vitamins, and herbs you take  Include the amounts, and when and why you take them  Bring the list or the pill bottles to follow-up visits  Carry your medicine list with you in case of an emergency  Care for your eyes:   · Get regular eye exams  Get your eyes checked at least every year  · Eat healthy foods  Fresh fruits and vegetables that are rich in vitamins A and C may help with your vision  Foods such as sweet potatoes, apricots, and carrots are rich in nutrients for the eyes  · Take care of your contacts or glasses  Store, clean, and use your contacts or glasses as directed  Replace your glasses or contact lenses as often as your healthcare provider suggests  · Decrease eye strain  Rest your eyes, especially after you read or use a computer for long periods of time  Get plenty of sleep at night  Use lights that reduce glare in your home, school, or workplace  · Wear dark sunglasses  This will help prevent pain and light sensitivity  Make sure the sunglasses have UVA and UVB protection  This will protect your eyes when you go outside  · Use eyedrops safely  If your treatment plan includes eyedrops, it is important to use them as directed  Your provider may give you detailed instructions to follow  The eyedrops may also come with safety instructions  Follow all instructions to help prevent an infection  Do not touch the tip of the bottle to your eye  Germs from your eye can spread to the medicine bottle  Help prevent corneal abrasions:   · Remove your contact lenses if your eyes feel dry or irritated  · Wash your hands if you need to touch your eyes or your face  · Trim your fingernails so you cannot scratch your eye  · Wear protective eyewear when you work with chemicals, wood, dust, or metal     · Wear protective eyewear when you play sports  · Do not wear your contacts for longer than you should  · Do not sleep with your contacts in  · Do not wear glitter makeup  Glitter can easily get into your eyes and under contact lenses  Follow up with your doctor or ophthalmologist as directed:  Write down your questions so you remember to ask them during your visits  © Copyright [employer] 2021 Information is for End User's use only and may not be sold, redistributed or otherwise used for commercial purposes  All illustrations and images included in CareNotes® are the copyrighted property of A D A M , Inc  or Aaliyah Washington   The above information is an  only  It is not intended as medical advice for individual conditions or treatments  Talk to your doctor, nurse or pharmacist before following any medical regimen to see if it is safe and effective for you

## 2022-01-02 NOTE — PROGRESS NOTES
[facility]        NAME: Jana Vargas is a 27 y o  female  : 1995    MRN: [id number]  DATE: 2022  TIME: 10:05 AM    Assessment and Plan   Abrasion of left cornea, initial encounter [S05  02XA]  1  Abrasion of left cornea, initial encounter  ofloxacin (OCUFLOX) 0 3 % ophthalmic solution         Patient Instructions     [facility]  Address: [address] [place], [place] [address], [facility], [address] [facility]  Phone: [phone]    Continue to monitor symptoms  If new or worsening symptoms develop, go immediately to Er  Drink plenty of fluids  Follow up with Eye Doctor this week  Chief Complaint     Chief Complaint   Patient presents with    Eye Pain     left eye pain from protective glasses hitting eye, pain  reddness, tearing         History of Present Illness       Eye Pain   The left eye is affected  This is a new problem  The current episode started yesterday  The problem occurs constantly  The problem has been unchanged  Injury mechanism: Pt put on a pair of goggles and the snapped against her L eye  The pain is moderate  She wears contacts  Associated symptoms include eye redness, a foreign body sensation, itching and photophobia  Pertinent negatives include no blurred vision, eye discharge, double vision, fever, nausea or vomiting  She has tried nothing for the symptoms  The treatment provided no relief  Review of Systems   Review of Systems   Constitutional: Negative for chills, diaphoresis, fatigue and fever  HENT: Negative for congestion, ear pain, rhinorrhea, sinus pressure and voice change  Eyes: Positive for photophobia, pain, redness and itching  Negative for blurred vision, double vision and discharge  Respiratory: Negative for cough, chest tightness and shortness of breath  Cardiovascular: Negative for chest pain and palpitations  Gastrointestinal: Negative for constipation, diarrhea, nausea and vomiting  Endocrine: Negative      Genitourinary: Negative for dysuria  Musculoskeletal: Negative for back pain, myalgias and neck pain  Skin: Negative for pallor and rash  Allergic/Immunologic: Negative  Neurological: Negative for dizziness, syncope and headaches  Hematological: Negative  Psychiatric/Behavioral: Negative  Current Medications       Current Outpatient Medications:     desogestrel-ethinyl estradiol (VIORELE) 0 15-0 02/0 01 MG (21/5) per tablet, Take 1 tablet by mouth daily (Patient not taking: Reported on 1/2/2022 ), Disp: , Rfl:     ofloxacin (OCUFLOX) 0 3 % ophthalmic solution, Administer 1 drop into the left eye 4 (four) times a day, Disp: 5 mL, Rfl: 0    ondansetron (ZOFRAN) 4 mg tablet, Take 1 tablet (4 mg total) by mouth every 6 (six) hours as needed for nausea or vomiting (Patient not taking: Reported on 1/2/2022 ), Disp: 8 tablet, Rfl: 0    Current Allergies     Allergies as of 01/02/2022 - Reviewed 01/02/2022   Allergen Reaction Noted    Wheat bran - food allergy Abdominal Pain 10/05/2015            The following portions of the patient's history were reviewed and updated as appropriate: allergies, current medications, past family history, past medical history, past social history, past surgical history and problem list      Past Medical History:   Diagnosis Date    Celiac disease     IBS (irritable bowel syndrome)        Past Surgical History:   Procedure Laterality Date    ESOPHAGOGASTRODUODENOSCOPY         History reviewed  No pertinent family history  Medications have been verified  Objective   /72   Pulse 97   Temp (!) 96 °F (35 6 °C)   Resp 18   Wt 61 7 kg (136 lb)   LMP 12/27/2021   SpO2 100%   BMI 25 70 kg/m²        Physical Exam     Physical Exam  Vitals and nursing note reviewed  Constitutional:       General: She is not in acute distress  Appearance: She is well-developed  She is not diaphoretic  HENT:      Head: Normocephalic and atraumatic        Right Ear: External ear normal       Left Ear: External ear normal    Eyes:      General: Lids are normal  Lids are everted, no foreign bodies appreciated  Gaze aligned appropriately  Right eye: No foreign body or discharge  Left eye: No foreign body or discharge  Extraocular Movements:      Right eye: Normal extraocular motion and no nystagmus  Left eye: Normal extraocular motion and no nystagmus  Conjunctiva/sclera:      Left eye: Left conjunctiva is injected  No exudate or hemorrhage  Pupils: Pupils are equal, round, and reactive to light  Left eye: Pupil is round, reactive and not sluggish  Corneal abrasion present  Slit lamp exam:     Left eye: No corneal ulcer or foreign body  Cardiovascular:      Rate and Rhythm: Normal rate and regular rhythm  Heart sounds: Normal heart sounds  Pulmonary:      Effort: Pulmonary effort is normal  No respiratory distress  Breath sounds: Normal breath sounds  No wheezing or rales  Musculoskeletal:      Cervical back: Normal range of motion and neck supple  Lymphadenopathy:      Cervical: No cervical adenopathy  Skin:     General: Skin is warm  Findings: No rash
